# Patient Record
Sex: FEMALE | Race: WHITE | HISPANIC OR LATINO | Employment: UNEMPLOYED | URBAN - METROPOLITAN AREA
[De-identification: names, ages, dates, MRNs, and addresses within clinical notes are randomized per-mention and may not be internally consistent; named-entity substitution may affect disease eponyms.]

---

## 2020-02-03 ENCOUNTER — OFFICE VISIT (OUTPATIENT)
Dept: CARDIOLOGY | Facility: CLINIC | Age: 25
End: 2020-02-03

## 2020-02-03 ENCOUNTER — HOSPITAL ENCOUNTER (OUTPATIENT)
Dept: CARDIOLOGY | Facility: HOSPITAL | Age: 25
Discharge: HOME | End: 2020-02-03
Admitting: INTERNAL MEDICINE

## 2020-02-03 VITALS
WEIGHT: 147 LBS | HEART RATE: 95 BPM | SYSTOLIC BLOOD PRESSURE: 130 MMHG | HEIGHT: 63 IN | BODY MASS INDEX: 26.05 KG/M2 | DIASTOLIC BLOOD PRESSURE: 80 MMHG

## 2020-02-03 DIAGNOSIS — R07.2 PRECORDIAL PAIN: ICD-10-CM

## 2020-02-03 DIAGNOSIS — R07.2 PRECORDIAL PAIN: Primary | ICD-10-CM

## 2020-02-03 LAB
BH CV STRESS BP STAGE 1: NORMAL
BH CV STRESS BP STAGE 2: NORMAL
BH CV STRESS BP STAGE 3: NORMAL
BH CV STRESS DURATION MIN STAGE 1: 3
BH CV STRESS DURATION MIN STAGE 2: 3
BH CV STRESS DURATION MIN STAGE 3: 3
BH CV STRESS DURATION SEC STAGE 1: 0
BH CV STRESS DURATION SEC STAGE 2: 0
BH CV STRESS DURATION SEC STAGE 3: 0
BH CV STRESS GRADE STAGE 1: 10
BH CV STRESS GRADE STAGE 2: 12
BH CV STRESS GRADE STAGE 3: 14
BH CV STRESS HR STAGE 1: 136
BH CV STRESS HR STAGE 2: 148
BH CV STRESS HR STAGE 3: 169
BH CV STRESS METS STAGE 1: 5
BH CV STRESS METS STAGE 2: 7.5
BH CV STRESS METS STAGE 3: 10
BH CV STRESS PROTOCOL 1: NORMAL
BH CV STRESS RECOVERY BP: NORMAL MMHG
BH CV STRESS RECOVERY HR: 115 BPM
BH CV STRESS SPEED STAGE 1: 1.7
BH CV STRESS SPEED STAGE 2: 2.5
BH CV STRESS SPEED STAGE 3: 3.4
BH CV STRESS STAGE 1: 1
BH CV STRESS STAGE 2: 2
BH CV STRESS STAGE 3: 3
MAXIMAL PREDICTED HEART RATE: 196 BPM
PERCENT MAX PREDICTED HR: 86.22 %
STRESS BASELINE BP: NORMAL MMHG
STRESS BASELINE HR: 107 BPM
STRESS PERCENT HR: 101 %
STRESS POST ESTIMATED WORKLOAD: 10 METS
STRESS POST EXERCISE DUR MIN: 9 MIN
STRESS POST EXERCISE DUR SEC: 0 SEC
STRESS POST PEAK BP: NORMAL MMHG
STRESS POST PEAK HR: 169 BPM
STRESS TARGET HR: 167 BPM

## 2020-02-03 PROCEDURE — 93016 CV STRESS TEST SUPVJ ONLY: CPT | Performed by: INTERNAL MEDICINE

## 2020-02-03 PROCEDURE — 99204 OFFICE O/P NEW MOD 45 MIN: CPT | Performed by: INTERNAL MEDICINE

## 2020-02-03 PROCEDURE — 93018 CV STRESS TEST I&R ONLY: CPT | Performed by: INTERNAL MEDICINE

## 2020-02-03 PROCEDURE — 93017 CV STRESS TEST TRACING ONLY: CPT

## 2020-02-03 PROCEDURE — 93000 ELECTROCARDIOGRAM COMPLETE: CPT | Performed by: INTERNAL MEDICINE

## 2020-02-03 NOTE — PROGRESS NOTES
Gracie Fortune  1995  Date of Office Visit: 02/03/20  Encounter Provider: Servando Renee MD  Place of Service: [facility] CARDIOLOGY      CHIEF COMPLAINT:  Chest discomfort  Reported history of aortic valve stenosis      HISTORY OF PRESENT ILLNESS:A very pleasant 24-year-old female with self-reported history of aortic valve stenosis with no evidence of aortic valve stenosis and a trileaflet valve on her echo in 2013, who presents to me for evaluation of that but also chest discomfort. Per her repot she has had intermittent chest pain for multiple years including when she was a teenager. She states that this is sharp and electric-like with occasional radiation to the right arm. This lasts 1-2 minutes and then goes away on its own. She does not have any increase with positional alteration or deep inspiration. This is not exertional. No orthopnea, PND, or dyspnea.     Review of Systems   Constitution: Negative for fever and malaise/fatigue.   HENT: Negative for nosebleeds and sore throat.    Eyes: Negative for blurred vision and double vision.   Cardiovascular: Positive for chest pain. Negative for claudication, palpitations and syncope.   Respiratory: Negative for cough, shortness of breath and snoring.    Endocrine: Negative for cold intolerance, heat intolerance and polydipsia.   Skin: Negative for itching, poor wound healing and rash.   Musculoskeletal: Negative for joint pain, joint swelling, muscle weakness and myalgias.   Gastrointestinal: Negative for abdominal pain, melena, nausea and vomiting.   Neurological: Negative for light-headedness, loss of balance, seizures, vertigo and weakness.   Psychiatric/Behavioral: Negative for altered mental status and depression.       Past medical history  Chest discomfort  Self-reported history of aortic valve stenosis    The following portions of the patient's history were reviewed and updated as appropriate: Social history ,  "Family history and Surgical history     No current outpatient medications on file prior to visit.     No current facility-administered medications on file prior to visit.        No Known Allergies    Vitals:    02/03/20 1453   BP: 130/80   Pulse: 95   Weight: 66.7 kg (147 lb)   Height: 160 cm (63\")     Physical Exam   Constitutional: She is oriented to person, place, and time. She appears well-developed and well-nourished.   HENT:   Head: Normocephalic and atraumatic.   Eyes: Conjunctivae and EOM are normal. No scleral icterus.   Neck: Normal range of motion. Neck supple. Normal carotid pulses, no hepatojugular reflux and no JVD present. Carotid bruit is not present. No tracheal deviation present. No thyromegaly present.   Cardiovascular: Normal rate and regular rhythm. Exam reveals no gallop and no friction rub.   No murmur heard.  Pulses:       Carotid pulses are 2+ on the right side, and 2+ on the left side.       Radial pulses are 2+ on the right side, and 2+ on the left side.        Femoral pulses are 2+ on the right side, and 2+ on the left side.       Dorsalis pedis pulses are 2+ on the right side, and 2+ on the left side.        Posterior tibial pulses are 2+ on the right side, and 2+ on the left side.   Pulmonary/Chest: Breath sounds normal. No respiratory distress. She has no decreased breath sounds. She has no wheezes. She has no rhonchi. She has no rales. She exhibits no tenderness.   Abdominal: Soft. Bowel sounds are normal. She exhibits no distension. There is no tenderness. There is no rebound.   Musculoskeletal: She exhibits no edema or deformity.   Neurological: She is alert and oriented to person, place, and time. She has normal strength. No sensory deficit.   Skin: No rash noted. No erythema.   Psychiatric: She has a normal mood and affect. Her behavior is normal.          Lab Results   Component Value Date    WBC 11.78 (H) 04/04/2019    HGB 13.4 04/04/2019    HCT 40.4 04/04/2019    MCV 88.6 " 04/04/2019     04/04/2019       Lab Results   Component Value Date    GLUCOSE 102 (H) 03/22/2015    BUN 13 04/04/2019    CREATININE 0.8 04/04/2019    BCR 16.3 04/04/2019    K 4.4 04/04/2019    CO2 29 04/04/2019    CALCIUM 9.4 04/04/2019    ALBUMIN 4.3 04/04/2019    LABIL2 1.4 04/04/2019    AST 19 04/04/2019    ALT 20 04/04/2019       Lab Results   Component Value Date    GLUCOSE 102 (H) 03/22/2015    CALCIUM 9.4 04/04/2019     04/04/2019    K 4.4 04/04/2019    CO2 29 04/04/2019     04/04/2019    BUN 13 04/04/2019    CREATININE 0.8 04/04/2019    BCR 16.3 04/04/2019       No results found for: CHOL, CHLPL, TRIG, HDL, LDL, LDLDIRECT      ECG 12 Lead  Date/Time: 2/3/2020 3:10 PM  Performed by: Servando Renee MD  Authorized by: Servando Renee MD   Comparison: compared with previous ECG from 4/4/2019  Similar to previous ECG  Rhythm: sinus rhythm  Rate: normal  QRS axis: normal    Clinical impression: normal ECG                  DISCUSSION/SUMMARYA 24-year-old female with a medical history of chest pain but also self-reported aortic valve stenosis who presents to me for evaluation of chest discomfort. On her last echo, she had a trileaflet aortic valve and no evidence of aortic valve stenosis. I do not think she needs additional workup of this.    Her chest pain is short in duration and does not appear to be exertional; however, she does not exert herself much. She does have some radiation to her right arm. She denies any dyspnea.    1.  Chest discomfort.   -  I would recommend just a standard treadmill stress test. I think that would be sufficient for evaluation and will likely                 be normal.  2.  Self-reported history of aortic valve stenosis:  I do not think this requires any additional workup. Her last transthoracic       echocardiogram in 2013 was within normal limits. I do not think she needs additional workup of her aortic valve, as her       more recent testing has  looked great.